# Patient Record
Sex: FEMALE | Race: WHITE
[De-identification: names, ages, dates, MRNs, and addresses within clinical notes are randomized per-mention and may not be internally consistent; named-entity substitution may affect disease eponyms.]

---

## 2018-03-15 ENCOUNTER — HOSPITAL ENCOUNTER (OUTPATIENT)
Dept: HOSPITAL 45 - C.MRI | Age: 63
Discharge: HOME | End: 2018-03-15
Attending: ORTHOPAEDIC SURGERY
Payer: COMMERCIAL

## 2018-03-15 DIAGNOSIS — R52: Primary | ICD-10-CM

## 2018-03-15 NOTE — DIAGNOSTIC IMAGING REPORT
MRI OF THE SACRUM COMBO



CLINICAL HISTORY: Sacral pain.



COMPARISON STUDY: No priors.



TECHNIQUE: MRI of the sacrum was performed utilizing various T1 and T2-weighted

sequences in the axial, sagittal, and coronal planes. Contrast-enhanced

sequences were acquired following the IV administration of 9 cc of Gadavist.



FINDINGS: There are no marrow signal abnormalities/edema identified in the

sacrum. Marrow signal intensity is slightly heterogeneous. No destructive bony

lesion is seen. There is mild degenerative change with no significant edema seen

involving the sacroiliac joints. A 3.2 x 2.7 x 2.5 cm Tarlov cyst is identified

in the right aspect of the sacrum at the level of S2. This could potentially

impinge on the right S2-S3 sacral foramen. A 7 mm Tarlov cyst is identified in

the left aspect of the sacrum at this level. No enhancing mass lesion is

identified on the postcontrast sequences. There is a posterior disc bulge at

L5-S1. Facet arthropathy at L5-S1 causes minimal bilateral neural foraminal

stenosis. Mild epidural lipomatosis is suggested in the lower central canal. A

1.7 cm cystic focus is noted in the left ovary.



IMPRESSION:



1. There is no marrow edema or destructive bony process identified involving the

sacrum.



2. There is a 3.2 cm Tarlov cyst identified on the right at the level of S2.

This is of indeterminant significance and could potentially impinge on the right

S2-S3 sacral foramen. Clinical correlation will be required.



3. A 1.7 cm cystic focus is noted in the left ovary. This is of low suspicion

but is an abnormal finding in a post menopausal female. Follow-up with a pelvic

ultrasound is recommended for further assessment.









Dictated:  3/15/2018 4:30 PM

Transcribed:  3/15/2018 4:45 PM

[place]







Electronically signed by:  Fabrice Wiggins M.D.

3/15/2018 7:12 PM



Dictated Date/Time:  3/15/2018 4:30 PM